# Patient Record
Sex: MALE | Race: WHITE | NOT HISPANIC OR LATINO | Employment: FULL TIME | URBAN - METROPOLITAN AREA
[De-identification: names, ages, dates, MRNs, and addresses within clinical notes are randomized per-mention and may not be internally consistent; named-entity substitution may affect disease eponyms.]

---

## 2024-06-26 ENCOUNTER — OFFICE VISIT (OUTPATIENT)
Dept: URGENT CARE | Facility: CLINIC | Age: 69
End: 2024-06-26
Payer: COMMERCIAL

## 2024-06-26 VITALS
BODY MASS INDEX: 29.12 KG/M2 | WEIGHT: 215 LBS | HEART RATE: 81 BPM | SYSTOLIC BLOOD PRESSURE: 170 MMHG | DIASTOLIC BLOOD PRESSURE: 108 MMHG | OXYGEN SATURATION: 98 % | RESPIRATION RATE: 16 BRPM | TEMPERATURE: 97.7 F | HEIGHT: 72 IN

## 2024-06-26 DIAGNOSIS — R03.0 ELEVATED BLOOD PRESSURE READING: ICD-10-CM

## 2024-06-26 DIAGNOSIS — L30.9 DERMATITIS: Primary | ICD-10-CM

## 2024-06-26 PROCEDURE — 99213 OFFICE O/P EST LOW 20 MIN: CPT | Performed by: PHYSICIAN ASSISTANT

## 2024-06-26 RX ORDER — TRIAMCINOLONE ACETONIDE 1 MG/G
CREAM TOPICAL
Qty: 30 G | Refills: 0 | Status: SHIPPED | OUTPATIENT
Start: 2024-06-26

## 2024-06-26 RX ORDER — IBUPROFEN 200 MG
TABLET ORAL EVERY 6 HOURS PRN
COMMUNITY

## 2024-06-26 NOTE — PATIENT INSTRUCTIONS
You may stop the over-the-counter cortisone cream and use prescription steroid cream as instructed.    Recommend getting your new kitten checked by vet.    Blood pressure in office today significantly elevated.  Do recommend that you get established with primary care for recheck.  Referral placed within the [facility]'s system for your convenience.  Check with your medical insurance to see what providers are covered.    Follow-up as needed.

## 2024-06-26 NOTE — PROGRESS NOTES
[facility]    NAME: Channing Espinoza is a 68 y.o. male  : 1955    MRN: [id number]  DATE: 2024  TIME: 3:49 PM    Assessment and Plan   Dermatitis [L30.9]  1. Dermatitis  triamcinolone (KENALOG) 0.1 % cream      2. Elevated blood pressure reading  Ambulatory Referral to Family Practice          Patient Instructions     Patient Instructions   You may stop the over-the-counter cortisone cream and use prescription steroid cream as instructed.    Recommend getting your new kitten checked by vet.    Blood pressure in office today significantly elevated.  Do recommend that you get established with primary care for recheck.  Referral placed within the [facility]'s [facility] for your convenience.  Check with your medical insurance to see what providers are covered.    Follow-up as needed.    Chief Complaint     Chief Complaint   Patient presents with    Rash     BL rash to hands. Pt reports it started 2 days ago. Pt reports itching to hands.        History of Present Illness   Channing Espinoza presents to the clinic c/o  68-year-old male comes in with itchy rash on hands.    Started: Monday night noticed itchy spots on both hands.  Associated signs and symptoms:  Modifying factors: Using over-the-counter cortisone cream without any relief.  Tea tree oil without any relief.  Known Exposures: Just got new kitten on Monday.  No one else with similar lesions but says he lives alone.  Will be taking kitten to vet in the near future.  Has not seen any specific insects on kitten.    Does not have primary care provider.  Says it has been about 20+ years since last seen medically.      Rash        Review of Systems   Review of Systems   Constitutional: Negative.    Eyes:  Negative for visual disturbance.   Respiratory: Negative.     Cardiovascular: Negative.    Skin:  Positive for rash.   Neurological:  Negative for dizziness and headaches.       Current Medications     Long-Term Medications   Medication Sig  Dispense Refill    ibuprofen (MOTRIN) 200 mg tablet Take by mouth every 6 (six) hours as needed for mild pain      triamcinolone (KENALOG) 0.1 % cream Apply and rub into  affected skin 3-4 times per day.  Do not use on face or neck. 30 g 0       Current Allergies     Allergies as of 06/26/2024    (No Known Allergies)          The following portions of the patient's history were reviewed and updated as appropriate: allergies, current medications, past family history, past medical history, past social history, past surgical history and problem list.  History reviewed. No pertinent past medical history.  History reviewed. No pertinent surgical history.  History reviewed. No pertinent family history.    Objective   BP (!) 170/108 Comment: recheck L. arm, large cuff  Pulse 81   Temp 97.7 °F (36.5 °C) (Tympanic)   Resp 16   Ht 6' (1.829 m)   Wt 97.5 kg (215 lb)   SpO2 98%   BMI 29.16 kg/m²   No LMP for male patient.       Physical Exam     Physical Exam  Vitals and nursing note reviewed.   Constitutional:       General: He is not in acute distress.     Appearance: He is well-developed. He is not ill-appearing, toxic-appearing or diaphoretic.   Cardiovascular:      Rate and Rhythm: Normal rate and regular rhythm.      Heart sounds: Normal heart sounds. No murmur heard.     No friction rub. No gallop.   Pulmonary:      Effort: Pulmonary effort is normal. No respiratory distress.      Breath sounds: Normal breath sounds. No stridor. No wheezing, rhonchi or rales.   Chest:      Chest wall: No tenderness.   Musculoskeletal:      Cervical back: Normal range of motion and neck supple. No rigidity or tenderness.   Lymphadenopathy:      Cervical: No cervical adenopathy.   Skin:     General: Skin is warm and dry.      Findings: Rash present.      Comments: Red papular lesions scattered on dorsal aspect of hands.  No interdigital lesions noted or burrowing.  Each lesion approximately 1 to 2 mm.  Nontender to palpation.  No  pustules.  Slight vesicles noted.  Palms are spared.  No scaling.  Approximately 10-15 lesions total.   Neurological:      General: No focal deficit present.      Mental Status: He is alert and oriented to person, place, and time.   Psychiatric:         Mood and Affect: Mood normal.         Behavior: Behavior normal.

## 2025-05-14 ENCOUNTER — OFFICE VISIT (OUTPATIENT)
Dept: URGENT CARE | Facility: CLINIC | Age: 70
End: 2025-05-14
Payer: COMMERCIAL

## 2025-05-14 VITALS
DIASTOLIC BLOOD PRESSURE: 98 MMHG | RESPIRATION RATE: 18 BRPM | HEART RATE: 76 BPM | SYSTOLIC BLOOD PRESSURE: 148 MMHG | OXYGEN SATURATION: 96 % | TEMPERATURE: 97.5 F

## 2025-05-14 DIAGNOSIS — Z76.89 ENCOUNTER TO ESTABLISH CARE: ICD-10-CM

## 2025-05-14 DIAGNOSIS — M79.652 ACUTE PAIN OF LEFT THIGH: Primary | ICD-10-CM

## 2025-05-14 PROCEDURE — 99213 OFFICE O/P EST LOW 20 MIN: CPT | Performed by: PHYSICIAN ASSISTANT

## 2025-05-14 RX ORDER — NAPROXEN 500 MG/1
500 TABLET ORAL 2 TIMES DAILY WITH MEALS
Qty: 60 TABLET | Refills: 0 | Status: SHIPPED | OUTPATIENT
Start: 2025-05-14

## 2025-05-14 NOTE — PROGRESS NOTES
[facility]    NAME: Channing Espinoza is a 69 y.o. male  : 1955    MRN: [id number]  DATE: May 14, 2025  TIME: 1:07 PM    Assessment and Plan   Acute pain of left thigh [M79.652]  1. Acute pain of left thigh  Ambulatory Referral to Family Practice    naproxen (Naprosyn) 500 mg tablet      2. Encounter to establish care  Ambulatory Referral to Family Practice        Note given for work.  Information on surrounding family practice offices given with instruction for patient to call to get established.    Patient Instructions     Patient Instructions   Rest and avoid activities that seem to aggravate pain today.    Stop Ibuprofen.  Start Naprosyn and take as instructed.    No x-rays indicated today.      May do warm or cold compresses to leg for comfort as needed.    You need to get established  with primary care provider for further evaluation.      Chief Complaint     Chief Complaint   Patient presents with    Leg Pain     Started yesterday. Pain in left leg. Not sure what is causing pain. Had difficulty walking and standing on leg. Pain is located in the thigh on the outside and back of the thigh. Sharp pain. Tried advil.        History of Present Illness   Channing Espinoza presents to the clinic c/o  Patient comes in after starting with left lateral thigh pain yesterday without any specific known injury.  He first noted it when he was getting out of his parked car carrying his Container.  Pain was on the lateral aspect of his left thigh.  No associated back pain.  It was an aching sensation.  He went home and sat for a couple hours and then when he went to get up he felt a sharp pain in his lateral thigh and was having trouble weightbearing as that caused increased pain.  He said he had to hug the walls and shift his foot to walk.    Denies any associated swelling, rashes, redness.  No weakness of the leg.  Notes a little fuzzy sensation on lateral thigh and a small area.  Denies any back problems.  No  saddle anesthesia.    Was taking 4 ibuprofen 200 mg every 4-6 hours for comfort.  Slept okay other than to get up for his normal bathroom.    Pain has waxed and waned.  Had trouble walking into clinic but now can weight-bear without difficulty.    Works at [employer] and is on his feet several hours per day.  He says that for quite a while he has noted some stiffness in his hips and knees when he first gets up but no real discomfort and goes away with walking.    Does not have primary care provider.  Did not get established with primary care provider after his last visit here.        Review of Systems   Review of Systems   Constitutional:  Positive for activity change. Negative for appetite change, chills, fatigue and fever.   Cardiovascular:  Positive for leg swelling. Negative for chest pain.        Patient does have some swelling right leg that is not new.  No current swelling left leg.  Does get some swelling of ankles when it is hot.   Gastrointestinal:  Negative for abdominal pain.   Genitourinary:  Negative for difficulty urinating.   Musculoskeletal:  Positive for gait problem and myalgias. Negative for back pain.   Skin:  Negative for color change, rash and wound.   Neurological:  Negative for dizziness, weakness and numbness.       Current Medications   Long-Term Medications[1]    Current Allergies     Allergies as of 05/14/2025    (No Known Allergies)          The following portions of the patient's history were reviewed and updated as appropriate: allergies, current medications, past family history, past medical history, past social history, past surgical history and problem list.  History reviewed. No pertinent past medical history.  History reviewed. No pertinent surgical history.  History reviewed. No pertinent family history.    Objective   /98   Pulse 76   Temp 97.5 °F (36.4 °C) (Tympanic)   Resp 18   SpO2 96%   No LMP for male patient.       Physical Exam     Physical Exam  Vitals  and nursing note reviewed.   Constitutional:       General: He is not in acute distress.     Appearance: He is not ill-appearing, toxic-appearing or diaphoretic.      Comments: Well-developed well-nourished male in no acute distress.  Able to get up and down on exam room table without difficulty.  Gait is slow and steady.  No obvious antalgia during provider visit.   HENT:      Head: Normocephalic and atraumatic.     Cardiovascular:      Rate and Rhythm: Normal rate and regular rhythm.      Heart sounds: Normal heart sounds. No murmur heard.     No friction rub. No gallop.   Pulmonary:      Effort: Pulmonary effort is normal. No respiratory distress.      Breath sounds: Normal breath sounds. No stridor. No wheezing, rhonchi or rales.   Abdominal:      Tenderness: There is no abdominal tenderness. There is no right CVA tenderness, left CVA tenderness, guarding or rebound.     Musculoskeletal:         General: No swelling, tenderness, deformity or signs of injury.      Cervical back: Normal range of motion and neck supple. No rigidity or tenderness.      Lumbar back: No edema, spasms, tenderness or bony tenderness. Normal range of motion. Negative right straight leg raise test and negative left straight leg raise test. No scoliosis.      Right hip: No deformity, tenderness, bony tenderness or crepitus. Normal range of motion. Normal strength.      Left hip: No deformity, tenderness, bony tenderness or crepitus. Normal range of motion. Normal strength.      Right upper leg: No swelling, edema, deformity, tenderness or bony tenderness.      Left upper leg: No swelling, edema, deformity, tenderness or bony tenderness.      Right knee: No swelling, deformity, effusion, erythema or bony tenderness. Normal range of motion. No tenderness.      Left knee: No swelling, deformity, effusion, erythema or bony tenderness. Normal range of motion. No tenderness.      Right lower leg: Swelling present. No deformity, tenderness or  bony tenderness. No edema.      Left lower leg: No swelling, deformity, tenderness or bony tenderness. No edema.      Right ankle:      Right Achilles Tendon: No tenderness.      Left ankle:      Left Achilles Tendon: No tenderness.      Comments: Mild flattening of lumbar spine.  Good range of motion without pain.  No thoracic or lumbar spinal process TTP.  No paravertebral muscle TTP.  No SI joint TTP bilaterally.  Mild pain left groin with left Phil's testing.  Mild discomfort medial left knee with external rotation left hip.  Hips NTTP.  No pain with IR/ER hips.     Lymphadenopathy:      Cervical: No cervical adenopathy.     Skin:     General: Skin is warm and dry.      Coloration: Skin is not pale.     Neurological:      Mental Status: He is alert and oriented to person, place, and time.     Psychiatric:         Mood and Affect: Mood normal.         Behavior: Behavior normal.                  [1]   Long-Term Medications   Medication Sig Dispense Refill    ibuprofen (MOTRIN) 200 mg tablet Take by mouth every 6 (six) hours as needed for mild pain      naproxen (Naprosyn) 500 mg tablet Take 1 tablet (500 mg total) by mouth 2 (two) times a day with meals 60 tablet 0    triamcinolone (KENALOG) 0.1 % cream Apply and rub into  affected skin 3-4 times per day.  Do not use on face or neck. (Patient not taking: Reported on 5/14/2025) 30 g 0

## 2025-05-14 NOTE — LETTER
May 14, 2025     Patient: Channing Espinoza   YOB: 1955   Date of Visit: 5/14/2025       To Whom It May Concern:    Above patient seen in office today for acute medical ailment.  May attempt return to work in next 1-3 days as tolerated.          Sincerely,        Rosalind Dozier PA-C    CC: No Recipients